# Patient Record
Sex: FEMALE | Race: WHITE | NOT HISPANIC OR LATINO | Employment: FULL TIME | URBAN - METROPOLITAN AREA
[De-identification: names, ages, dates, MRNs, and addresses within clinical notes are randomized per-mention and may not be internally consistent; named-entity substitution may affect disease eponyms.]

---

## 2017-02-17 ENCOUNTER — TELEPHONE (OUTPATIENT)
Dept: SPORTS MEDICINE | Facility: CLINIC | Age: 27
End: 2017-02-17

## 2017-02-17 DIAGNOSIS — R00.2 PALPITATIONS: Primary | ICD-10-CM

## 2017-02-18 ENCOUNTER — APPOINTMENT (OUTPATIENT)
Dept: GENERAL RADIOLOGY | Facility: HOSPITAL | Age: 27
End: 2017-02-18

## 2017-02-18 ENCOUNTER — HOSPITAL ENCOUNTER (EMERGENCY)
Facility: HOSPITAL | Age: 27
Discharge: HOME | End: 2017-02-18
Attending: EMERGENCY MEDICINE | Admitting: EMERGENCY MEDICINE

## 2017-02-18 VITALS
HEIGHT: 64 IN | BODY MASS INDEX: 18.78 KG/M2 | HEART RATE: 89 BPM | TEMPERATURE: 99.5 F | DIASTOLIC BLOOD PRESSURE: 68 MMHG | OXYGEN SATURATION: 99 % | WEIGHT: 110 LBS | SYSTOLIC BLOOD PRESSURE: 103 MMHG | RESPIRATION RATE: 18 BRPM

## 2017-02-18 DIAGNOSIS — R00.2 PALPITATIONS: ICD-10-CM

## 2017-02-18 DIAGNOSIS — R07.89 ATYPICAL CHEST PAIN: Primary | ICD-10-CM

## 2017-02-18 DIAGNOSIS — R00.0 SINUS TACHYCARDIA: ICD-10-CM

## 2017-02-18 LAB
ALBUMIN SERPL-MCNC: 4 G/DL (ref 3.5–5.2)
ALBUMIN/GLOB SERPL: 1.5 G/DL
ALP SERPL-CCNC: 52 U/L (ref 39–117)
ALT SERPL W P-5'-P-CCNC: 18 U/L (ref 1–33)
ANION GAP SERPL CALCULATED.3IONS-SCNC: 11.3 MMOL/L
AST SERPL-CCNC: 18 U/L (ref 1–32)
BASOPHILS # BLD AUTO: 0.01 10*3/MM3 (ref 0–0.2)
BASOPHILS NFR BLD AUTO: 0.2 % (ref 0–1.5)
BILIRUB SERPL-MCNC: 0.6 MG/DL (ref 0.1–1.2)
BUN BLD-MCNC: 9 MG/DL (ref 6–20)
BUN/CREAT SERPL: 11.1 (ref 7–25)
CALCIUM SPEC-SCNC: 9 MG/DL (ref 8.6–10.5)
CHLORIDE SERPL-SCNC: 102 MMOL/L (ref 98–107)
CO2 SERPL-SCNC: 26.7 MMOL/L (ref 22–29)
CREAT BLD-MCNC: 0.81 MG/DL (ref 0.57–1)
D DIMER PPP FEU-MCNC: <0.27 MCGFEU/ML (ref 0–0.49)
DEPRECATED RDW RBC AUTO: 42.8 FL (ref 37–54)
EOSINOPHIL # BLD AUTO: 0.05 10*3/MM3 (ref 0–0.7)
EOSINOPHIL NFR BLD AUTO: 1.1 % (ref 0.3–6.2)
ERYTHROCYTE [DISTWIDTH] IN BLOOD BY AUTOMATED COUNT: 12.6 % (ref 11.7–13)
GFR SERPL CREATININE-BSD FRML MDRD: 84 ML/MIN/1.73
GLOBULIN UR ELPH-MCNC: 2.6 GM/DL
GLUCOSE BLD-MCNC: 99 MG/DL (ref 65–99)
HCG SERPL QL: NEGATIVE
HCT VFR BLD AUTO: 39.9 % (ref 35.6–45.5)
HGB BLD-MCNC: 13.6 G/DL (ref 11.9–15.5)
IMM GRANULOCYTES # BLD: 0 10*3/MM3 (ref 0–0.03)
IMM GRANULOCYTES NFR BLD: 0 % (ref 0–0.5)
LYMPHOCYTES # BLD AUTO: 1.23 10*3/MM3 (ref 0.9–4.8)
LYMPHOCYTES NFR BLD AUTO: 26.2 % (ref 19.6–45.3)
MAGNESIUM SERPL-MCNC: 2.1 MG/DL (ref 1.6–2.6)
MCH RBC QN AUTO: 31.3 PG (ref 26.9–32)
MCHC RBC AUTO-ENTMCNC: 34.1 G/DL (ref 32.4–36.3)
MCV RBC AUTO: 91.9 FL (ref 80.5–98.2)
MONOCYTES # BLD AUTO: 0.42 10*3/MM3 (ref 0.2–1.2)
MONOCYTES NFR BLD AUTO: 8.9 % (ref 5–12)
NEUTROPHILS # BLD AUTO: 2.99 10*3/MM3 (ref 1.9–8.1)
NEUTROPHILS NFR BLD AUTO: 63.6 % (ref 42.7–76)
PLATELET # BLD AUTO: 222 10*3/MM3 (ref 140–500)
PMV BLD AUTO: 10.7 FL (ref 6–12)
POTASSIUM BLD-SCNC: 4.5 MMOL/L (ref 3.5–5.2)
PROT SERPL-MCNC: 6.6 G/DL (ref 6–8.5)
RBC # BLD AUTO: 4.34 10*6/MM3 (ref 3.9–5.2)
SODIUM BLD-SCNC: 140 MMOL/L (ref 136–145)
TROPONIN T SERPL-MCNC: <0.01 NG/ML (ref 0–0.03)
WBC NRBC COR # BLD: 4.7 10*3/MM3 (ref 4.5–10.7)

## 2017-02-18 PROCEDURE — 71020 HC CHEST PA AND LATERAL: CPT

## 2017-02-18 PROCEDURE — 93010 ELECTROCARDIOGRAM REPORT: CPT | Performed by: INTERNAL MEDICINE

## 2017-02-18 PROCEDURE — 93005 ELECTROCARDIOGRAM TRACING: CPT

## 2017-02-18 PROCEDURE — 84703 CHORIONIC GONADOTROPIN ASSAY: CPT | Performed by: EMERGENCY MEDICINE

## 2017-02-18 PROCEDURE — 84484 ASSAY OF TROPONIN QUANT: CPT | Performed by: EMERGENCY MEDICINE

## 2017-02-18 PROCEDURE — 80053 COMPREHEN METABOLIC PANEL: CPT | Performed by: EMERGENCY MEDICINE

## 2017-02-18 PROCEDURE — 36415 COLL VENOUS BLD VENIPUNCTURE: CPT

## 2017-02-18 PROCEDURE — 96360 HYDRATION IV INFUSION INIT: CPT

## 2017-02-18 PROCEDURE — 85379 FIBRIN DEGRADATION QUANT: CPT | Performed by: EMERGENCY MEDICINE

## 2017-02-18 PROCEDURE — 85025 COMPLETE CBC W/AUTO DIFF WBC: CPT | Performed by: EMERGENCY MEDICINE

## 2017-02-18 PROCEDURE — 83735 ASSAY OF MAGNESIUM: CPT | Performed by: EMERGENCY MEDICINE

## 2017-02-18 PROCEDURE — 99284 EMERGENCY DEPT VISIT MOD MDM: CPT

## 2017-02-18 RX ADMIN — SODIUM CHLORIDE 1000 ML: 9 INJECTION, SOLUTION INTRAVENOUS at 14:48

## 2017-02-18 NOTE — ED PROVIDER NOTES
"EMERGENCY DEPARTMENT ENCOUNTER       CHIEF COMPLAINT  Chief Complaint: Chest Pain  History given by: Patient, Significant Other  History limited by: N/A  Room Number: 30/30  PMD: Cipriano Zazueta MD      HPI:  HPI Comments: Pt c/o intermittent episodes of bilateral interscapular pain radiating to the LUE onset about 2 days ago. Pt reports that her most recent episode was earlier today and is not currently present. Pt states that exertion does not worsen sx. Pt denies BLE edema, diaphoresis, and dyspnea, but reports that she has had palpitations described as \"rapid\". Pt is scheduled for an appointment with [facility] on 02/23/17. Pt reports that she does not smoke and is not on any oral contraceptives. There are no other complaints at this time.     Patient is a 27 y.o. female presenting with chest pain.   Chest Pain   Chest pain location: interscapular bilaterally.  Pain quality: aching    Pain radiates to:  L arm  Pain severity:  Moderate  Onset quality:  Gradual  Duration: onset about 2 days ago.  Timing:  Intermittent  Progression:  Waxing and waning (but not present currently)  Context: at rest    Relieved by:  Nothing  Worsened by:  Nothing (not worsened with exertion)  Associated symptoms: palpitations (\"rapid\")    Associated symptoms: no abdominal pain, no back pain, no cough, no dizziness, no fatigue, no headache, no heartburn, no lower extremity edema, no nausea, no near-syncope, no numbness, no shortness of breath, no syncope, no vomiting and no weakness          PAST MEDICAL HISTORY  Active Ambulatory Problems     Diagnosis Date Noted   • Syncope 10/25/2016   • Hypoglycemia 10/25/2016     Resolved Ambulatory Problems     Diagnosis Date Noted   • No Resolved Ambulatory Problems     Past Medical History   Diagnosis Date   • Nodular hyperplasia of liver          PAST SURGICAL HISTORY  History reviewed. No pertinent past surgical history.      FAMILY HISTORY  Family History   Problem " "Relation Age of Onset   • Diabetes Mother    • Hypertension Mother    • Hypertension Father    • Diabetes Maternal Grandmother          SOCIAL HISTORY  Social History     Social History   • Marital status: Single     Spouse name: N/A   • Number of children: N/A   • Years of education: N/A     Occupational History   • Not on file.     Social History Main Topics   • Smoking status: Never Smoker   • Smokeless tobacco: Not on file   • Alcohol use Yes      Comment: socially   • Drug use: No   • Sexual activity: Not on file     Other Topics Concern   • Not on file     Social History Narrative   • No narrative on file         ALLERGIES  Penicillins        REVIEW OF SYSTEMS  Review of Systems   Constitutional: Negative for chills and fatigue.   HENT: Negative for congestion, rhinorrhea and sore throat.    Eyes: Negative for pain.   Respiratory: Negative for cough, shortness of breath and wheezing.    Cardiovascular: Positive for chest pain (interscapular pain bilaterally - not currently present) and palpitations (\"rapid\"). Negative for leg swelling, syncope and near-syncope.   Gastrointestinal: Negative for abdominal pain, diarrhea, heartburn, nausea and vomiting.   Genitourinary: Negative for difficulty urinating, dysuria, flank pain and frequency.   Musculoskeletal: Negative for arthralgias, back pain, myalgias, neck pain and neck stiffness.   Skin: Negative for rash.   Neurological: Negative for dizziness, speech difficulty, weakness, light-headedness, numbness and headaches.   Psychiatric/Behavioral: Negative.    All other systems reviewed and are negative.           PHYSICAL EXAM  ED Triage Vitals   Temp Heart Rate Resp BP SpO2   02/18/17 1321 02/18/17 1321 02/18/17 1321 02/18/17 1343 02/18/17 1321   98.8 °F (37.1 °C) 134 18 131/85 100 % WNL      Temp src Heart Rate Source Patient Position BP Location FiO2 (%)   02/18/17 1321 02/18/17 1321 -- -- --   Tympanic Monitor          Physical Exam   Constitutional: She is " oriented to person, place, and time. No distress.   HENT:   Head: Normocephalic.   Mouth/Throat: Mucous membranes are normal.   Eyes: EOM are normal. Pupils are equal, round, and reactive to light.   Neck: Normal range of motion. Neck supple.   Cardiovascular: Regular rhythm and normal heart sounds.  Tachycardia present.    Mild tachycardia.    Pulmonary/Chest: Effort normal and breath sounds normal. No respiratory distress. She has no decreased breath sounds. She has no wheezes. She has no rhonchi. She has no rales.   Abdominal: Soft. There is no tenderness. There is no rebound and no guarding.   Musculoskeletal: Normal range of motion.   Neurological: She is alert and oriented to person, place, and time. She has normal sensation and normal strength.   Skin: Skin is warm and dry.   Psychiatric: Mood and affect normal.   Nursing note and vitals reviewed.          LAB RESULTS  Recent Results (from the past 24 hour(s))   Comprehensive Metabolic Panel    Collection Time: 02/18/17  2:48 PM   Result Value Ref Range    Glucose 99 65 - 99 mg/dL    BUN 9 6 - 20 mg/dL    Creatinine 0.81 0.57 - 1.00 mg/dL    Sodium 140 136 - 145 mmol/L    Potassium 4.5 3.5 - 5.2 mmol/L    Chloride 102 98 - 107 mmol/L    CO2 26.7 22.0 - 29.0 mmol/L    Calcium 9.0 8.6 - 10.5 mg/dL    Total Protein 6.6 6.0 - 8.5 g/dL    Albumin 4.00 3.50 - 5.20 g/dL    ALT (SGPT) 18 1 - 33 U/L    AST (SGOT) 18 1 - 32 U/L    Alkaline Phosphatase 52 39 - 117 U/L    Total Bilirubin 0.6 0.1 - 1.2 mg/dL    eGFR Non African Amer 84 >60 mL/min/1.73    Globulin 2.6 gm/dL    A/G Ratio 1.5 g/dL    BUN/Creatinine Ratio 11.1 7.0 - 25.0    Anion Gap 11.3 mmol/L   hCG, Serum, Qualitative    Collection Time: 02/18/17  2:48 PM   Result Value Ref Range    HCG Qualitative Negative Indeterminate, Negative   Magnesium    Collection Time: 02/18/17  2:48 PM   Result Value Ref Range    Magnesium 2.1 1.6 - 2.6 mg/dL   CBC Auto Differential    Collection Time: 02/18/17  2:48 PM   Result  Value Ref Range    WBC 4.70 4.50 - 10.70 10*3/mm3    RBC 4.34 3.90 - 5.20 10*6/mm3    Hemoglobin 13.6 11.9 - 15.5 g/dL    Hematocrit 39.9 35.6 - 45.5 %    MCV 91.9 80.5 - 98.2 fL    MCH 31.3 26.9 - 32.0 pg    MCHC 34.1 32.4 - 36.3 g/dL    RDW 12.6 11.7 - 13.0 %    RDW-SD 42.8 37.0 - 54.0 fl    MPV 10.7 6.0 - 12.0 fL    Platelets 222 140 - 500 10*3/mm3    Neutrophil % 63.6 42.7 - 76.0 %    Lymphocyte % 26.2 19.6 - 45.3 %    Monocyte % 8.9 5.0 - 12.0 %    Eosinophil % 1.1 0.3 - 6.2 %    Basophil % 0.2 0.0 - 1.5 %    Immature Grans % 0.0 0.0 - 0.5 %    Neutrophils, Absolute 2.99 1.90 - 8.10 10*3/mm3    Lymphocytes, Absolute 1.23 0.90 - 4.80 10*3/mm3    Monocytes, Absolute 0.42 0.20 - 1.20 10*3/mm3    Eosinophils, Absolute 0.05 0.00 - 0.70 10*3/mm3    Basophils, Absolute 0.01 0.00 - 0.20 10*3/mm3    Immature Grans, Absolute 0.00 0.00 - 0.03 10*3/mm3   Troponin    Collection Time: 02/18/17  2:48 PM   Result Value Ref Range    Troponin T <0.010 0.000 - 0.030 ng/mL   D-dimer, Quantitative    Collection Time: 02/18/17  2:48 PM   Result Value Ref Range    D-Dimer, Quantitative <0.27 0.00 - 0.49 MCGFEU/mL       Ordered the above labs and reviewed the results.        RADIOLOGY  XR Chest 2 View - Negative acute. Interpreted by radiologist. Independently viewed by me.             Ordered the above noted radiological studies. Reviewed by me in PACS.       PROCEDURES  Procedures    EKG:           EKG time: 13:40  Rhythm/Rate: Sinus tachycardia rate 105  P waves and VT: Normal P. Normal JOHN.   Left atrial enlargement  QRS, axis: Normal axis.  ST and T waves: Nonspecific ST changes     Interpreted Contemporaneously by me, independently viewed  No old EKG available for comparison           PROGRESS AND CONSULTS  ED Course     2:33 PM: CXR, D-Dimer, and blood work ordered for further evaluation. IV fluids ordered to hydrate pt.     3:51 PM: Obtained pt's blood work and CXR results. Placed call to [facility].    4:06 PM:  Discussed case with Dr. Caro, cardiologist. She states that pt is scheduled for an appointment with her on 02/23/17. She would like for pt to f/u as scheduled. She is aware of pt's ZIO patch results.     4:10 PM: Rechecked pt. Pt is resting comfortably and appears in no acute distress. Informed pt that her CXR is negative acute. Troponin is negative. D-Dimer is <0.27. Informed pt also of my d/w Dr. Caro. Pt advised to f/u as scheduled with [facility] on 02/23/17. RTER warnings given. Pt agrees with plan for discharge.           MEDICAL DECISION MAKING    MDM  Number of Diagnoses or Management Options  Atypical chest pain:   Palpitations:   Sinus tachycardia:      Amount and/or Complexity of Data Reviewed  Clinical lab tests: reviewed and ordered (Troponin is negative. D-Dimer is <0.27. )  Tests in the radiology section of CPT®: ordered and reviewed (CXR is negative acute. )  Tests in the medicine section of CPT®: ordered and reviewed (EKG interpreted.   )  Decide to obtain previous medical records or to obtain history from someone other than the patient: yes  Review and summarize past medical records: yes (Pt had 2-week ZIO patch placed in late 12/2016 that showed HR varying from 47 to 184 and rare PVC. Pt had normal ECHO performed on 11/30/16. TSH and Free T4 were normal in 10/2016. )  Discuss the patient with other providers: yes (Dr. Caro, cardiologist, will f/u with pt in the office. )    Patient Progress  Patient progress: stable             DIAGNOSIS  Final diagnoses:   Atypical chest pain   Palpitations   Sinus tachycardia         DISPOSITION  Pt discharged.    DISCHARGE    Patient discharged in stable condition.    Reviewed implications of results, diagnosis, meds, responsibility to follow up, warning signs and symptoms of possible worsening, potential complications and reasons to return to ER.    Patient/Family voiced understanding of above instructions.    Discussed plan for discharge, as there  is no emergent indication for admission.  Pt/family is agreeable and understands need for follow up and repeat testing.  Pt is aware that discharge does not mean that nothing is wrong but it indicates no emergency is present that requires admission and they must continue care with follow-up as given below or physician of their choice.     FOLLOW-UP  Cipriano Zazueta MD  [address]  [address]  [facility] [id number] [phone]          Alyson Caro MD  [address]  [address]  [facility] [address]  [phone]      keep appointment next Thursday          Latest Documented Vital Signs:  As of 4:16 PM  BP- 103/68 HR- 84 Temp- 99.5 °F (37.5 °C) (Tympanic) O2 sat- 99%        --  Documentation assistance provided by padmini Simon for Dr. Malu MD.  Information recorded by the scribe was done at my direction and has been verified and validated by me.                Blaise Simon  02/18/17 8530       Sanjeev Toribio MD  02/18/17 0563

## 2017-02-18 NOTE — ED NOTES
Pt c/o constant pain between shoulder blades that started this past Thursday. Pt reports no recent injury but states she did work out x4 days this week. No obvious deformity noted.      Christina Goff RN  02/18/17 1614

## 2017-02-18 NOTE — ED NOTES
PT RESTING COMFORTABLY IN NAD. PT AWAITING MD DISPOSITION.      Christina Goff RN  02/18/17 7125

## 2017-02-18 NOTE — ED NOTES
CP with pain /B/ shoulder blades since Thursday with increased HR and SOA     Meghan Purdy RN  02/18/17 5600

## 2017-02-20 ENCOUNTER — TELEPHONE (OUTPATIENT)
Dept: SOCIAL WORK | Facility: HOSPITAL | Age: 27
End: 2017-02-20

## 2017-02-20 NOTE — TELEPHONE ENCOUNTER
Spoke with pt today in f/u and she states that she is feeling better and plans on keeping her f/u appt with Dr. Caro this Thursday. No other questions or concerns voiced by pt at this time. Teresa GALLAGHER

## 2017-02-23 ENCOUNTER — OFFICE VISIT (OUTPATIENT)
Dept: CARDIOLOGY | Facility: CLINIC | Age: 27
End: 2017-02-23

## 2017-02-23 VITALS
HEART RATE: 79 BPM | DIASTOLIC BLOOD PRESSURE: 64 MMHG | SYSTOLIC BLOOD PRESSURE: 108 MMHG | BODY MASS INDEX: 19.12 KG/M2 | WEIGHT: 112 LBS | HEIGHT: 64 IN

## 2017-02-23 DIAGNOSIS — R55 SYNCOPE, UNSPECIFIED SYNCOPE TYPE: ICD-10-CM

## 2017-02-23 DIAGNOSIS — R10.11 RUQ PAIN: ICD-10-CM

## 2017-02-23 DIAGNOSIS — R00.0 SINUS TACHYCARDIA: Primary | ICD-10-CM

## 2017-02-23 DIAGNOSIS — R00.2 PALPITATIONS: ICD-10-CM

## 2017-02-23 PROCEDURE — 99204 OFFICE O/P NEW MOD 45 MIN: CPT | Performed by: INTERNAL MEDICINE

## 2017-02-23 PROCEDURE — 93000 ELECTROCARDIOGRAM COMPLETE: CPT | Performed by: INTERNAL MEDICINE

## 2017-02-23 RX ORDER — ATENOLOL 25 MG/1
25 TABLET ORAL DAILY PRN
Qty: 30 TABLET | Refills: 11 | Status: SHIPPED | OUTPATIENT
Start: 2017-02-23 | End: 2017-08-02

## 2017-02-23 NOTE — PROGRESS NOTES
PATIENTINFORMATION    Date of Office Visit: 2017  Encounter Provider: Alyson Caro MD  Place of Service: [facility]  Patient Name: Queenie Mccoy  : 1990    Subjective:     Encounter Date:2017      Patient ID: Queenie Mccoy is a 27 y.o. female.      History of Present Illness     This is a nice lady who began having episodes of palpitations and free syncope in the fall of .  Her primary care provider who referred her here today, ordered a ZIO Patch which showed sinus rhythm with an average heart rate of 83 beats per minute.  There were rare premature ventricular contractions.   She hit the button for symptoms twice and both times showed just sinus tachycardia.   She also had an echocardiogram in 2016 which showed normal left ventricular systolic function with an ejection fraction of 60% and no valvular heart disease.      She continues to have episodes where she will feel like her heart is suddenly racing out of her chest.  She does not feel well during those episodes.  She will feel kind of like she is going to pass out and sort of lightheaded and unwell.  She had a trip to the emergency room on 2017 for these symptoms and her EKG there showed sinus tachycardia at 108 beats per minute.  She states that she was symptomatic when that EKG was performed. She does not note any exacerbating or relieving factors for these symptoms.        Review of Systems   Constitution: Positive for malaise/fatigue. Negative for fever, weight gain and weight loss.   HENT: Negative for ear pain, hearing loss, nosebleeds and sore throat.    Eyes: Negative for double vision, pain, vision loss in left eye and vision loss in right eye.   Cardiovascular:        See history of present illness.   Respiratory: Negative for cough, shortness of breath, sleep disturbances due to breathing, snoring and wheezing.    Endocrine: Negative for cold intolerance, heat  "intolerance and polyuria.   Skin: Negative for itching, poor wound healing and rash.   Musculoskeletal: Negative for joint pain, joint swelling and myalgias.   Gastrointestinal: Negative for abdominal pain, diarrhea, hematochezia, nausea and vomiting.   Genitourinary: Negative for hematuria and hesitancy.   Neurological: Negative for numbness, paresthesias and seizures.   Psychiatric/Behavioral: Negative for depression. The patient is not nervous/anxious.            ECG 12 Lead  Date/Time: 2/23/2017 3:22 PM  Performed by: JOCELYN HANKINS  Authorized by: JOCELYN HANKINS   Comparison: compared with previous ECG from 2/18/2017  Comparison to previous ECG: Her heart rate is now slower.  She has a different P-wave morphology in V1 and V2  Rhythm: sinus rhythm  BPM: 79  Conduction: conduction normal  ST Segments: ST segments normal  Clinical impression: normal ECG               Objective:       Visit Vitals   • /64 (BP Location: Right arm)   • Pulse 79   • Ht 64\" (162.6 cm)   • Wt 112 lb (50.8 kg)   • BMI 19.22 kg/m2    Body mass index is 19.22 kg/(m^2).     Physical Exam   Constitutional: She appears well-developed.   HENT:   Head: Normocephalic and atraumatic.   Eyes: Conjunctivae and lids are normal. Pupils are equal, round, and reactive to light. Lids are everted and swept, no foreign bodies found.   Neck: Normal range of motion. No JVD present. Carotid bruit is not present. No tracheal deviation present. No thyroid mass present.   Cardiovascular: Normal rate, regular rhythm and normal heart sounds.    Pulses:       Dorsalis pedis pulses are 2+ on the right side, and 2+ on the left side.   Pulmonary/Chest: Effort normal and breath sounds normal.   Abdominal: Normal appearance and bowel sounds are normal.   Musculoskeletal: Normal range of motion.   Neurological: She is alert. She has normal strength.   Skin: Skin is warm, dry and intact.   Psychiatric: She has a normal mood and affect. Her behavior is normal. "   Vitals reviewed.      Lab Review: I reviewed her lab work from her recent ER visit.  As well as a TSH from a fall      Assessment/Plan:        Symptomatic tachycardia.  I looked over her lab work. Her thyroid is normal, she is not anemic,  her kidney function and electrolytes look good. She denies any anxiety when she is having this happen.  There may be some dehydration involved.  I think she may possibly be having a little bit of an atrial tachycardia that may just be really close to the sinus node.   I would like to show her EKGs to Dr. Bryan.   She may also just be having a little bit of sinus tachycardia due to some dehydration.  We have talked if she has these symptoms again to try a Valsalva maneuver.   She is going to try to drink some fluids.  If that does not work, I have given her a prescription for Atenolol 25 mg to take up to twice a day, if needed, for the palpitations.  I am going to have her follow back up with me in a couple of weeks and see if she is feeling any better.  She is also having some right upper quadrant pain, so we will check a gallbladder ultrasound.          Orders Placed This Encounter   Procedures   • US gallbladder     Standing Status:   Future     Standing Expiration Date:   2/23/2018     Order Specific Question:   Reason for Exam:     Answer:   RUQ pain   • ECG 12 Lead     This order was created via procedure documentation      Queenie Mccoy   Home Medication Instructions JAGDISH:    Printed on:02/23/17 5632   Medication Information                      atenolol (TENORMIN) 25 MG tablet  Take 1 tablet by mouth Daily As Needed (heart racing).                        Alyson Caro MD  02/23/17  3:24 PM

## 2017-03-01 ENCOUNTER — HOSPITAL ENCOUNTER (OUTPATIENT)
Dept: ULTRASOUND IMAGING | Facility: HOSPITAL | Age: 27
Discharge: HOME | End: 2017-03-01
Attending: INTERNAL MEDICINE | Admitting: INTERNAL MEDICINE

## 2017-03-01 DIAGNOSIS — R10.11 RUQ PAIN: ICD-10-CM

## 2017-03-01 PROCEDURE — 76705 ECHO EXAM OF ABDOMEN: CPT

## 2017-04-06 ENCOUNTER — OFFICE VISIT (OUTPATIENT)
Dept: CARDIOLOGY | Facility: CLINIC | Age: 27
End: 2017-04-06

## 2017-04-06 VITALS
HEART RATE: 84 BPM | HEIGHT: 64 IN | RESPIRATION RATE: 16 BRPM | WEIGHT: 115 LBS | BODY MASS INDEX: 19.63 KG/M2 | SYSTOLIC BLOOD PRESSURE: 108 MMHG | DIASTOLIC BLOOD PRESSURE: 70 MMHG

## 2017-04-06 DIAGNOSIS — R00.2 PALPITATIONS: Primary | ICD-10-CM

## 2017-04-06 DIAGNOSIS — R00.0 SINUS TACHYCARDIA: ICD-10-CM

## 2017-04-06 PROCEDURE — 99212 OFFICE O/P EST SF 10 MIN: CPT | Performed by: INTERNAL MEDICINE

## 2017-04-06 PROCEDURE — 93000 ELECTROCARDIOGRAM COMPLETE: CPT | Performed by: INTERNAL MEDICINE

## 2017-04-06 NOTE — PROGRESS NOTES
PATIENTINFORMATION    Date of Office Visit: 2017  Encounter Provider: Alyson Caro MD  Place of Service: [facility] CARDIOLOGY  Patient Name: Queenie Mccoy  : 1990    Subjective:     Encounter Date:2017      Patient ID: Queenie Mccoy is a 27 y.o. female.      History of Present Illness    This is a nice lady who began having episodes of palpitations and free syncope in the fall of . Her primary care provider who referred her here today, ordered a ZIO Patch which showed sinus rhythm with an average heart rate of 83 beats per minute. There were rare premature ventricular contractions. She hit the button for symptoms twice and both times showed just sinus tachycardia. She also had an echocardiogram in 2016 which showed normal left ventricular systolic function with an ejection fraction of 60% and no valvular heart disease.     I saw her in 2017 for symptoms of palpitations.  I gave her a prescription for atenolol 25 mg twice a day as needed.  I did review all of her blood work and she was not anemic and her thyroid was normal as were her electrolytes.    She comes in today for follow-up.  She has been feeling well.  She has not had any of the palpitations consistent with sinus tachycardia.  She has not had to take the atenolol.  She has been exercising and has no symptoms with exertion.  She does mention a different sort of palpitation which in her description is very consistent with the premature ventricular contraction.  These are not bothersome to her.    Review of Systems   Constitution: Negative for fever, malaise/fatigue, weight gain and weight loss.   HENT: Negative for ear pain, hearing loss, nosebleeds and sore throat.    Eyes: Negative for double vision, pain, vision loss in left eye and vision loss in right eye.   Cardiovascular:        See history of present illness.   Respiratory: Negative for cough, shortness of breath, sleep  "disturbances due to breathing, snoring and wheezing.    Endocrine: Negative for cold intolerance, heat intolerance and polyuria.   Skin: Negative for itching, poor wound healing and rash.   Musculoskeletal: Negative for joint pain, joint swelling and myalgias.   Gastrointestinal: Negative for abdominal pain, diarrhea, hematochezia, nausea and vomiting.   Genitourinary: Negative for hematuria and hesitancy.   Neurological: Negative for numbness, paresthesias and seizures.   Psychiatric/Behavioral: Negative for depression. The patient is not nervous/anxious.            ECG 12 Lead  Date/Time: 4/6/2017 1:12 PM  Performed by: JOCELYN HANKINS  Authorized by: JOCELYN HANKINS   Comparison: compared with previous ECG from 2/23/2017  Similar to previous ECG  Rhythm: sinus rhythm  BPM: 84  Conduction: conduction normal  ST Segments: ST segments normal  Clinical impression: normal ECG               Objective:     /70 (BP Location: Right arm, Patient Position: Sitting, Cuff Size: Adult)  Pulse 84  Resp 16  Ht 64\" (162.6 cm)  Wt 115 lb (52.2 kg)  BMI 19.74 kg/m2 Body mass index is 19.74 kg/(m^2).     Physical Exam   Constitutional: She appears well-developed.   HENT:   Head: Normocephalic and atraumatic.   Eyes: Conjunctivae and lids are normal. Pupils are equal, round, and reactive to light. Lids are everted and swept, no foreign bodies found.   Neck: Normal range of motion. No JVD present. Carotid bruit is not present. No tracheal deviation present. No thyroid mass present.   Cardiovascular: Normal rate, regular rhythm and normal heart sounds.    Pulses:       Dorsalis pedis pulses are 2+ on the right side, and 2+ on the left side.   Pulmonary/Chest: Effort normal and breath sounds normal.   Abdominal: Normal appearance and bowel sounds are normal.   Musculoskeletal: Normal range of motion.   Neurological: She is alert. She has normal strength.   Skin: Skin is warm, dry and intact.   Psychiatric: She has a normal " mood and affect. Her behavior is normal.   Vitals reviewed.          Assessment/Plan:       She had some palpitations associated with sinus tachycardia.  This has resolved.  She does have a prescription for atenolol if needed.  She has a second palpitation sensation which is quite consistent with premature ventricular contraction and I reassured her.  I will have her come back and see me if she has any change in her symptoms.    Orders Placed This Encounter   Procedures   • ECG 12 Lead     This order was created via procedure documentation      Queenie Mccoy   Home Medication Instructions JAGDISH:    Printed on:04/06/17 8930   Medication Information                      atenolol (TENORMIN) 25 MG tablet  Take 1 tablet by mouth Daily As Needed (heart racing).                        Alyson Caro MD  04/06/17  1:17 PM

## 2017-08-02 ENCOUNTER — OFFICE VISIT (OUTPATIENT)
Dept: SPORTS MEDICINE | Facility: CLINIC | Age: 27
End: 2017-08-02

## 2017-08-02 VITALS
HEART RATE: 83 BPM | DIASTOLIC BLOOD PRESSURE: 62 MMHG | WEIGHT: 119 LBS | OXYGEN SATURATION: 98 % | HEIGHT: 65 IN | SYSTOLIC BLOOD PRESSURE: 100 MMHG | BODY MASS INDEX: 19.83 KG/M2

## 2017-08-02 DIAGNOSIS — S76.011A STRAIN OF GLUTEUS MEDIUS, RIGHT, INITIAL ENCOUNTER: Primary | ICD-10-CM

## 2017-08-02 PROCEDURE — 99214 OFFICE O/P EST MOD 30 MIN: CPT | Performed by: FAMILY MEDICINE

## 2017-08-02 PROCEDURE — 73502 X-RAY EXAM HIP UNI 2-3 VIEWS: CPT | Performed by: FAMILY MEDICINE

## 2017-08-02 NOTE — PROGRESS NOTES
"Queenie is a 27 y.o. year old female    Chief Complaint   Patient presents with   • Right Hip - Pain       History of Present Illness  Patient here today with a six-day week history of intermittent episodes of right hip pain.  Discomfort initially began over the anterior hip but her most bothersome area of discomfort is over the lateral hip.  Discomfort will come on and last for a couple of days and then be gone for several days at a time and then return.  She notes no aggravating or precipitating events.  The only exercise she does is bar exercises.  She does not do endurance running or endurance weightbearing exercises.  The lateral hip pain can be worse lying on her right side at night.  No back pain or paresthesias.  She has not noted any clicking, locking, or giving way of the right hip.  No snapping sensations.  No paresthesias.    I have reviewed the patient's medical history in detail and updated the computerized patient record.    Review of Systems   Constitutional: Negative for fever.   Musculoskeletal:        Per history of present illness   Skin: Negative for wound.   Neurological: Negative for numbness.   All other systems reviewed and are negative.      /62  Pulse 83  Ht 64.5\" (163.8 cm)  Wt 119 lb (54 kg)  SpO2 98%  BMI 20.11 kg/m2     Physical Exam    Vital signs reviewed.   General: No acute distress.  Eyes: conjunctiva clear; pupils equally round and reactive  ENT: external ears and nose atraumatic; oropharynx clear  CV: no peripheral edema, 2+ distal pulses  Resp: normal respiratory effort, no use of accessory muscles  Skin: no rashes or wounds; normal turgor  Psych: mood and affect appropriate; recent and remote memory intact  Neuro: sensation to light touch intact    MSK Exam:  Right hip and low back normal in general appearance.  Negative logroll.  Patient has tenderness to palpation over the superior portion of the greater trochanter.  Patient has full painless range of motion of the " hip.  Patient has some lateral hip discomfort on internal rotation and also with resisted abduction.  There is no snapping or clicking.  No pain with resisted hip flexion or extension.  No pain with resisted hip adduction.  Motor 5 out of 5.    Right Hip X-Ray  Indication: Pain  AP and Frog Leg views    Findings:  No fracture  No bony lesion  Normal soft tissues  Normal joint spaces    No prior studies were available for comparison.      Diagnoses and all orders for this visit:    Right hip pain  -     XR Hip With or Without Pelvis 2 - 3 View Right    I have given her a set of home exercises for gluteus medius syndrome, she will follow-up in 3 weeks if no improvement in her pain.  Or return sooner if pain increases.    EMR Dragon/Transcription disclaimer:    Much of this encounter note is an electronic transcription/translation of spoken language to printed text.  The electronic translation of spoken language may permit erroneous, or at times, nonsensical words or phrases to be inadvertently transcribed.  Although I have reviewed the note for such errors some may still exist.

## 2017-09-19 ENCOUNTER — OFFICE VISIT (OUTPATIENT)
Dept: GASTROENTEROLOGY | Facility: CLINIC | Age: 27
End: 2017-09-19

## 2017-09-19 VITALS
WEIGHT: 119.4 LBS | TEMPERATURE: 98.4 F | SYSTOLIC BLOOD PRESSURE: 114 MMHG | DIASTOLIC BLOOD PRESSURE: 70 MMHG | BODY MASS INDEX: 20.38 KG/M2 | HEIGHT: 64 IN

## 2017-09-19 DIAGNOSIS — R10.13 EPIGASTRIC PAIN: ICD-10-CM

## 2017-09-19 DIAGNOSIS — R09.89 GLOBUS SENSATION: Primary | ICD-10-CM

## 2017-09-19 PROCEDURE — 99203 OFFICE O/P NEW LOW 30 MIN: CPT | Performed by: INTERNAL MEDICINE

## 2017-09-19 RX ORDER — OMEPRAZOLE 20 MG/1
20 CAPSULE, DELAYED RELEASE ORAL DAILY
Qty: 30 CAPSULE | Refills: 0 | Status: SHIPPED | OUTPATIENT
Start: 2017-09-19 | End: 2017-10-19

## 2017-09-19 NOTE — PROGRESS NOTES
Answers for HPI/ROS submitted by the patient on 9/12/2017   Abdominal pain  Chronicity: recurrent  Onset: more than 1 month ago  Onset quality: sudden  Frequency: every several days  Episode duration: 6 hours  Progression since onset: gradually worsening  Pain location: LUQ, RUQ, epigastric region  Pain - numeric: 7/10  Pain quality: burning, a sensation of fullness, sharp  Radiates to: periumbilical region, back  anorexia: No  arthralgias: No  belching: Yes  constipation: No  diarrhea: Yes  dysuria: No  fever: No  flatus: Yes  frequency: No  headaches: No  hematochezia: No  hematuria: No  melena: No  myalgias: No  nausea: Yes  weight loss: No  vomiting: No  Chief Complaint   Patient presents with   • Heartburn   • Abdominal Pain     Queenie Mccoy is a 27 y.o. female who presents with a History of abdominal pain and GERD   HPI     Patient 27-year-old female with history of focal nodular hyperplasia of the liver diagnosed in 2011.  Patient with recent ultrasound shows mild decrease in this size of the lesion.  Patient currently complaining of intermittent but patient's found by cardiology to be sinus tachycardia.  Patient reports these episodes seem to occur when her stomach is most upset.  While the stomach issues may last for several days her palpitations may last only for a few minutes.  Patient now here for further recommendations.  Patient denies nausea vomiting.  Patient reports there is some increase symptoms around meals and worse after lunch.  Patient reports no waking up from the pain and discomfort she describes.    Past Medical History:   Diagnosis Date   • Chest pain    • Hypoglycemia    • Lightheaded    • Malaise and fatigue    • Nodular hyperplasia of liver    • Palpitations    • PVC (premature ventricular contraction)     rare   • Right upper quadrant pain    • Sinus tachycardia    • Symptomatic tachycardia    • Syncope        Current Outpatient Prescriptions:   •  omeprazole (priLOSEC) 20 MG  capsule, Take 1 capsule by mouth Daily for 30 days., Disp: 30 capsule, Rfl: 0  Allergies   Allergen Reactions   • Penicillins      Social History     Social History   • Marital status: Single     Spouse name: N/A   • Number of children: N/A   • Years of education: N/A     Occupational History   • Not on file.     Social History Main Topics   • Smoking status: Never Smoker   • Smokeless tobacco: Not on file   • Alcohol use Yes      Comment: socially   • Drug use: No   • Sexual activity: Not on file     Other Topics Concern   • Not on file     Social History Narrative     Family History   Problem Relation Age of Onset   • Diabetes Mother    • Hypertension Mother    • Hypertension Father    • Diabetes Maternal Grandmother    • Pancreatic cancer Maternal Aunt    • Pancreatic cancer Maternal Uncle      Review of Systems   Constitutional: Negative.  Negative for fever.   HENT: Negative.    Eyes: Negative.    Respiratory: Negative.    Cardiovascular: Negative.    Gastrointestinal: Positive for abdominal pain, diarrhea and nausea. Negative for blood in stool, constipation, rectal pain and vomiting.   Endocrine: Negative.    Genitourinary: Negative for dysuria, frequency and hematuria.   Musculoskeletal: Negative.  Negative for arthralgias and myalgias.   Allergic/Immunologic: Positive for immunocompromised state.   Neurological: Negative for headaches.     Vitals:    09/19/17 1406   BP: 114/70   Temp: 98.4 °F (36.9 °C)     Physical Exam   Constitutional: She is oriented to person, place, and time. She appears well-developed and well-nourished.   HENT:   Head: Normocephalic and atraumatic.   Eyes: Pupils are equal, round, and reactive to light. No scleral icterus.   Cardiovascular: Normal rate and regular rhythm.  Exam reveals no gallop and no friction rub.    No murmur heard.  Pulmonary/Chest: Effort normal and breath sounds normal. She has no wheezes. She has no rales.   Abdominal: Soft. Bowel sounds are normal. She  exhibits no shifting dullness, no distension, no pulsatile liver, no fluid wave, no abdominal bruit, no ascites, no pulsatile midline mass and no mass. There is no hepatosplenomegaly. There is no tenderness. There is no rigidity and no guarding. No hernia.   Musculoskeletal: Normal range of motion. She exhibits no edema.   Neurological: She is alert and oriented to person, place, and time. No cranial nerve deficit.   Skin: Skin is warm and dry. No rash noted.   Psychiatric: She has a normal mood and affect. Her behavior is normal. Thought content normal.   Nursing note and vitals reviewed.    Diagnoses and all orders for this visit:    Globus sensation  -     FL Esophagram Complete; Future    Epigastric pain  -     FL Esophagram Complete; Future    Other orders  -     omeprazole (priLOSEC) 20 MG capsule; Take 1 capsule by mouth Daily for 30 days.    Patient 27-year-old female with history of focal nodular hyperplasia in the liver noted to be somewhat shrunken on ultrasound recently.  Patient complaining of multiple GI complaints including occasional globus, heartburn, epigastric pain and fullness.  Patient reports symptoms worse after lunch but do not wake her up at night.  Patient reports when episodes exacerbate she may notice palpitations.  Patient seen by cardiology though no reported GI issues were described at time, palpitations seem to occur more frequently when her GI symptoms are irritated.  At this point will arrange esophagram as well as begin omeprazole daily pending results may consider further evaluation with EGD.

## 2017-09-26 ENCOUNTER — HOSPITAL ENCOUNTER (OUTPATIENT)
Dept: GENERAL RADIOLOGY | Facility: HOSPITAL | Age: 27
Discharge: HOME | End: 2017-09-26
Attending: INTERNAL MEDICINE | Admitting: INTERNAL MEDICINE

## 2017-09-26 DIAGNOSIS — R10.13 EPIGASTRIC PAIN: ICD-10-CM

## 2017-09-26 DIAGNOSIS — R09.89 GLOBUS SENSATION: ICD-10-CM

## 2017-09-26 PROCEDURE — 74220 X-RAY XM ESOPHAGUS 1CNTRST: CPT

## 2017-09-27 ENCOUNTER — PREP FOR SURGERY (OUTPATIENT)
Dept: OTHER | Facility: HOSPITAL | Age: 27
End: 2017-09-27

## 2017-09-27 DIAGNOSIS — K21.00 GASTROESOPHAGEAL REFLUX DISEASE WITH ESOPHAGITIS: Primary | ICD-10-CM

## 2017-09-28 ENCOUNTER — TELEPHONE (OUTPATIENT)
Dept: GASTROENTEROLOGY | Facility: CLINIC | Age: 27
End: 2017-09-28

## 2017-09-28 NOTE — TELEPHONE ENCOUNTER
Patient called, no answer, message left advising of Dr Gama's note. Advised to call back with questions.

## 2017-09-28 NOTE — TELEPHONE ENCOUNTER
----- Message from Wally Gama MD sent at 9/27/2017  9:07 AM EDT -----  Positive reflux but no stricture seen.  Recommend EGD to evaluate cause symptoms

## 2017-10-23 PROBLEM — K21.00 GASTROESOPHAGEAL REFLUX DISEASE WITH ESOPHAGITIS: Status: ACTIVE | Noted: 2017-10-23

## 2017-11-10 ENCOUNTER — ANESTHESIA (OUTPATIENT)
Dept: GASTROENTEROLOGY | Facility: HOSPITAL | Age: 27
End: 2017-11-10

## 2017-11-10 ENCOUNTER — HOSPITAL ENCOUNTER (OUTPATIENT)
Facility: HOSPITAL | Age: 27
Setting detail: HOSPITAL OUTPATIENT SURGERY
Discharge: HOME | End: 2017-11-10
Attending: INTERNAL MEDICINE | Admitting: INTERNAL MEDICINE

## 2017-11-10 ENCOUNTER — ANESTHESIA EVENT (OUTPATIENT)
Dept: GASTROENTEROLOGY | Facility: HOSPITAL | Age: 27
End: 2017-11-10

## 2017-11-10 VITALS
RESPIRATION RATE: 16 BRPM | DIASTOLIC BLOOD PRESSURE: 60 MMHG | SYSTOLIC BLOOD PRESSURE: 97 MMHG | WEIGHT: 114.5 LBS | OXYGEN SATURATION: 100 % | BODY MASS INDEX: 19.55 KG/M2 | TEMPERATURE: 98.6 F | HEIGHT: 64 IN | HEART RATE: 67 BPM

## 2017-11-10 DIAGNOSIS — K21.00 GASTROESOPHAGEAL REFLUX DISEASE WITH ESOPHAGITIS: ICD-10-CM

## 2017-11-10 LAB
B-HCG UR QL: NEGATIVE
INTERNAL NEGATIVE CONTROL: NEGATIVE
INTERNAL POSITIVE CONTROL: POSITIVE
Lab: NORMAL

## 2017-11-10 PROCEDURE — 25010000002 PROPOFOL 10 MG/ML EMULSION: Performed by: ANESTHESIOLOGY

## 2017-11-10 PROCEDURE — 43239 EGD BIOPSY SINGLE/MULTIPLE: CPT | Performed by: INTERNAL MEDICINE

## 2017-11-10 PROCEDURE — 88305 TISSUE EXAM BY PATHOLOGIST: CPT | Performed by: INTERNAL MEDICINE

## 2017-11-10 PROCEDURE — 88312 SPECIAL STAINS GROUP 1: CPT | Performed by: INTERNAL MEDICINE

## 2017-11-10 PROCEDURE — S0260 H&P FOR SURGERY: HCPCS | Performed by: INTERNAL MEDICINE

## 2017-11-10 RX ORDER — SODIUM CHLORIDE 0.9 % (FLUSH) 0.9 %
3 SYRINGE (ML) INJECTION AS NEEDED
Status: DISCONTINUED | OUTPATIENT
Start: 2017-11-10 | End: 2017-11-10 | Stop reason: HOSPADM

## 2017-11-10 RX ORDER — ESOMEPRAZOLE MAGNESIUM 40 MG/1
40 CAPSULE, DELAYED RELEASE ORAL DAILY
Qty: 90 CAPSULE | Refills: 3 | Status: SHIPPED | OUTPATIENT
Start: 2017-11-10

## 2017-11-10 RX ORDER — LIDOCAINE HYDROCHLORIDE 10 MG/ML
0.5 INJECTION, SOLUTION INFILTRATION; PERINEURAL ONCE AS NEEDED
Status: DISCONTINUED | OUTPATIENT
Start: 2017-11-10 | End: 2017-11-10 | Stop reason: HOSPADM

## 2017-11-10 RX ORDER — PROPOFOL 10 MG/ML
VIAL (ML) INTRAVENOUS AS NEEDED
Status: DISCONTINUED | OUTPATIENT
Start: 2017-11-10 | End: 2017-11-10 | Stop reason: SURG

## 2017-11-10 RX ORDER — LIDOCAINE HYDROCHLORIDE 20 MG/ML
INJECTION, SOLUTION INFILTRATION; PERINEURAL AS NEEDED
Status: DISCONTINUED | OUTPATIENT
Start: 2017-11-10 | End: 2017-11-10 | Stop reason: SURG

## 2017-11-10 RX ORDER — SODIUM CHLORIDE, SODIUM LACTATE, POTASSIUM CHLORIDE, CALCIUM CHLORIDE 600; 310; 30; 20 MG/100ML; MG/100ML; MG/100ML; MG/100ML
1000 INJECTION, SOLUTION INTRAVENOUS CONTINUOUS PRN
Status: DISCONTINUED | OUTPATIENT
Start: 2017-11-10 | End: 2017-11-10 | Stop reason: HOSPADM

## 2017-11-10 RX ADMIN — PROPOFOL 250 MG: 10 INJECTION, EMULSION INTRAVENOUS at 09:14

## 2017-11-10 RX ADMIN — LIDOCAINE HYDROCHLORIDE 100 MG: 20 INJECTION, SOLUTION INFILTRATION; PERINEURAL at 09:14

## 2017-11-10 RX ADMIN — SODIUM CHLORIDE, POTASSIUM CHLORIDE, SODIUM LACTATE AND CALCIUM CHLORIDE 1000 ML: 600; 310; 30; 20 INJECTION, SOLUTION INTRAVENOUS at 08:35

## 2017-11-10 NOTE — PLAN OF CARE
Problem: Patient Care Overview (Adult)  Goal: Adult Individualization and Mutuality  Outcome: Ongoing (interventions implemented as appropriate)    Problem: GI Endoscopy (Adult)  Goal: Signs and Symptoms of Listed Potential Problems Will be Absent or Manageable (GI Endoscopy)  Outcome: Ongoing (interventions implemented as appropriate)    11/10/17 0808   GI Endoscopy   Problems Assessed (GI Endoscopy) all   Problems Present (GI Endoscopy) none

## 2017-11-10 NOTE — ANESTHESIA PREPROCEDURE EVALUATION
Anesthesia Evaluation     Patient summary reviewed and Nursing notes reviewed   NPO Solid Status: > 8 hours  NPO Liquid Status: > 8 hours     Airway   Mallampati: I  TM distance: <3 FB  Neck ROM: full  no difficulty expected  Dental - normal exam     Pulmonary - negative pulmonary ROS and normal exam   Cardiovascular - negative cardio ROS and normal exam        Neuro/Psych- negative ROS  GI/Hepatic/Renal/Endo - negative ROS     Musculoskeletal (-) negative ROS    Abdominal  - normal exam    Bowel sounds: normal.   Substance History - negative use     OB/GYN negative ob/gyn ROS         Other                                      Anesthesia Plan    ASA 1     Anesthetic plan and risks discussed with patient.

## 2017-11-10 NOTE — ANESTHESIA POSTPROCEDURE EVALUATION
Patient: Queenie Mccoy    Procedure Summary     Date Anesthesia Start Anesthesia Stop Room / Location    11/10/17 0914 0928  [facility] 5 /  [facility]       Procedure Diagnosis Surgeon Provider    ESOPHAGOGASTRODUODENOSCOPY with biopsies (N/A Esophagus) Gastritis; Duodenitis  (Gastroesophageal reflux disease with esophagitis [K21.0]) MD Anne Marie Mar MD          Anesthesia Type: No value filed.  Last vitals  BP   94/67 (11/10/17 0942)   Temp   37 °C (98.6 °F) (11/10/17 0825)   Pulse   61 (11/10/17 0942)   Resp   16 (11/10/17 0942)     SpO2   99 % (11/10/17 0942)     Post Anesthesia Care and Evaluation    Patient location during evaluation: bedside  Patient participation: complete - patient participated  Level of consciousness: awake  Pain management: adequate  Anesthetic complications: No anesthetic complications    Cardiovascular status: acceptable  Respiratory status: acceptable  Hydration status: acceptable

## 2017-11-10 NOTE — H&P
"[facility]  Pre Procedure History & Physical    Chief Complaint:   Dyspepsia and GERD    Subjective     HPI:   Patient 27-year-old female with history of reflux and atypical chest pain and palpitations as well as dyspepsia presenting for evaluation.  Patient denies vomiting but occasional nausea and chest symptoms increase when her dyspepsia worsens.    Past Medical History:   Past Medical History:   Diagnosis Date   • Chest pain    • GERD (gastroesophageal reflux disease)    • Hypoglycemia     in past.. over 10 years ago   • Lightheaded    • Malaise and fatigue    • Nodular hyperplasia of liver    • Palpitations    • PVC (premature ventricular contraction)     rare   • Right upper quadrant pain    • Sinus tachycardia    • Sternal pain     radiates to upper chest and back   • Symptomatic tachycardia     a couple of months ago...  saw [facility] cardiologist   • Syncope        Past Surgical History:  Past Surgical History:   Procedure Laterality Date   • COLONOSCOPY  11/11/2011    normal   • ENDOSCOPY  11/11/2011    normal esophagus, erythema, normal duodenum.       Family History:  Family History   Problem Relation Age of Onset   • Diabetes Mother    • Hypertension Mother    • Hypertension Father    • Diabetes Maternal Grandmother    • Pancreatic cancer Maternal Aunt    • Pancreatic cancer Maternal Uncle    • Malig Hyperthermia Neg Hx        Social History:   reports that she has never smoked. She does not have any smokeless tobacco history on file. She reports that she drinks alcohol. She reports that she does not use illicit drugs.    Medications:   No prescriptions prior to admission.       Allergies:  Penicillins    ROS:    Pertinent items are noted in HPI     Objective     Blood pressure 105/67, pulse 71, temperature 98.6 °F (37 °C), temperature source Oral, resp. rate 16, height 64\" (162.6 cm), weight 114 lb 8 oz (51.9 kg), last menstrual period 10/16/2017, SpO2 99 %.    Physical Exam "   Constitutional: Pt is oriented to person, place, and time and well-developed, well-nourished, and in no distress.   Mouth/Throat: Oropharynx is clear and moist.   Neck: Normal range of motion.   Cardiovascular: Normal rate, regular rhythm and normal heart sounds.    Pulmonary/Chest: Effort normal and breath sounds normal.   Abdominal: Soft. Nontender  Skin: Skin is warm and dry.   Psychiatric: Mood, memory, affect and judgment normal.     Assessment/Plan     Diagnosis:  Dyspepsia    Anticipated Surgical Procedure:  EGD    The risks, benefits, and alternatives of this procedure have been discussed with the patient or the responsible party- the patient understands and agrees to proceed.

## 2017-11-10 NOTE — BRIEF OP NOTE
ESOPHAGOGASTRODUODENOSCOPY  Progress Note    Queenie Mccoy  11/10/2017    Pre-op Diagnosis:   Gastroesophageal reflux disease with esophagitis [K21.0]       Post-Op Diagnosis Codes:     * Gastritis [K29.70]     * Duodenitis [K29.8]    Procedure/CPT® Codes:      Procedure(s):  ESOPHAGOGASTRODUODENOSCOPY with biopsies    Surgeon(s):  Wally Gama MD    Anesthesia: Monitor Anesthesia Care    Staff:   Endo Technician: Magaly Montano RN  Endo Nurse: Gayla Edwards RN    Estimated Blood Loss: minimal    Urine Voided: * No values recorded between 11/10/2017  9:08 AM and 11/10/2017  9:26 AM *    Specimens:                  ID Type Source Tests Collected by Time Destination   A :  Tissue Small Intestine, Duodenum TISSUE EXAM Wally Gama MD 11/10/2017 0921    B :  Tissue Gastric, Antrum TISSUE EXAM Wally Gama MD 11/10/2017 0923    C :  Tissue Esophagus, Mid TISSUE EXAM Wally Gama MD 11/10/2017 0923          Drains:           Findings: 27 female dyspepsia and GERD with globus  Mild duodenitis and gastritis  Normal esophagus    Complications: None      Wally Gama MD     Date: 11/10/2017  Time: 9:28 AM

## 2017-11-13 LAB
CYTO UR: NORMAL
LAB AP CASE REPORT: NORMAL
Lab: NORMAL
PATH REPORT.FINAL DX SPEC: NORMAL
PATH REPORT.GROSS SPEC: NORMAL

## 2017-12-15 ENCOUNTER — OFFICE VISIT (OUTPATIENT)
Dept: CARDIOLOGY | Facility: CLINIC | Age: 27
End: 2017-12-15

## 2017-12-15 VITALS
BODY MASS INDEX: 20.14 KG/M2 | HEART RATE: 84 BPM | DIASTOLIC BLOOD PRESSURE: 76 MMHG | HEIGHT: 64 IN | WEIGHT: 118 LBS | SYSTOLIC BLOOD PRESSURE: 108 MMHG | RESPIRATION RATE: 16 BRPM

## 2017-12-15 DIAGNOSIS — R06.09 DOE (DYSPNEA ON EXERTION): Primary | ICD-10-CM

## 2017-12-15 DIAGNOSIS — K21.00 GASTROESOPHAGEAL REFLUX DISEASE WITH ESOPHAGITIS: ICD-10-CM

## 2017-12-15 DIAGNOSIS — R00.2 PALPITATION: ICD-10-CM

## 2017-12-15 PROCEDURE — 93000 ELECTROCARDIOGRAM COMPLETE: CPT | Performed by: INTERNAL MEDICINE

## 2017-12-15 PROCEDURE — 99214 OFFICE O/P EST MOD 30 MIN: CPT | Performed by: INTERNAL MEDICINE

## 2017-12-15 NOTE — PROGRESS NOTES
PATIENTINFORMATION    Date of Office Visit: 12/15/2017  Encounter Provider: Alyson Caro MD  Place of Service: [facility] CARDIOLOGY  Patient Name: Queenie Mccoy  : 1990    Subjective:     Encounter Date:12/15/2017      Patient ID: Queenie Mccoy is a 27 y.o. female.      History of Present Illness    This is a nice lady who began having episodes of palpitations and free syncope in the fall of . Her primary care provider who referred her here today, ordered a ZIO Patch which showed sinus rhythm with an average heart rate of 83 beats per minute. There were rare premature ventricular contractions. She hit the button for symptoms twice and both times showed just sinus tachycardia. She also had an echocardiogram in 2016 which showed normal left ventricular systolic function with an ejection fraction of 60% and no valvular heart disease.      I saw her in 2017 for symptoms of palpitations.  I gave her a prescription for atenolol 25 mg twice a day as needed.  I did review all of her blood work and she was not anemic and her thyroid was normal as were her electrolytes.    I saw her back in 2017 and she was feeling well and had not needed to use the atenolol.    She comes in because she's been having palpitations again.  It's been going on for about 6 weeks.  She does note that she has increased stress in her life.  They come on randomly.  Her heart rate will be fast.  It lasts just a few minutes and that her heart rate will be back down to normal.  It's a gradual decline to normal.  One day she wasn't feeling well and walked out of the store and checked her watch and her heart rate was 150.  Usually her heart rates between 100 120 when she's having one of these fast episodes.  She feels weak and like she has less stamina that is normal.  She feels more short of breath when she's trying to exercise.    Review of Systems   Constitution: Negative for fever,  "malaise/fatigue, weight gain and weight loss.   HENT: Negative for ear pain, hearing loss, nosebleeds and sore throat.    Eyes: Negative for double vision, pain, vision loss in left eye and vision loss in right eye.   Cardiovascular:        See history of present illness.   Respiratory: Negative for cough, shortness of breath, sleep disturbances due to breathing, snoring and wheezing.    Endocrine: Negative for cold intolerance, heat intolerance and polyuria.   Skin: Negative for itching, poor wound healing and rash.   Musculoskeletal: Negative for joint pain, joint swelling and myalgias.   Gastrointestinal: Positive for abdominal pain and nausea. Negative for diarrhea, hematochezia and vomiting.   Genitourinary: Negative for hematuria and hesitancy.   Neurological: Positive for headaches and paresthesias. Negative for numbness and seizures.   Psychiatric/Behavioral: Negative for depression. The patient is nervous/anxious.            ECG 12 Lead  Date/Time: 12/15/2017 1:40 PM  Performed by: JOCELYN HANKINS  Authorized by: JOCELYN HANKINS   Comparison: compared with previous ECG from 4/6/2017  Similar to previous ECG  Rhythm: sinus rhythm  BPM: 84  ST Segments: ST segments normal  T Waves: T waves normal  Clinical impression: normal ECG               Objective:     /76 (BP Location: Right arm, Patient Position: Sitting, Cuff Size: Adult)  Pulse 84  Resp 16  Ht 162.6 cm (64\")  Wt 53.5 kg (118 lb)  BMI 20.25 kg/m2 Body mass index is 20.25 kg/(m^2).     Physical Exam   Constitutional: She appears well-developed.   HENT:   Head: Normocephalic and atraumatic.   Eyes: Conjunctivae and lids are normal. Pupils are equal, round, and reactive to light. Lids are everted and swept, no foreign bodies found.   Neck: Normal range of motion. No JVD present. Carotid bruit is not present. No tracheal deviation present. No thyroid mass present.   Cardiovascular: Normal rate, regular rhythm and normal heart sounds.  "   Pulses:       Dorsalis pedis pulses are 2+ on the right side, and 2+ on the left side.   Pulmonary/Chest: Effort normal and breath sounds normal.   Abdominal: Normal appearance and bowel sounds are normal.   Musculoskeletal: Normal range of motion.   Neurological: She is alert. She has normal strength.   Skin: Skin is warm, dry and intact.   Psychiatric: She has a normal mood and affect. Her behavior is normal.   Vitals reviewed.          Assessment/Plan:       1.  Palpitations.  She has not taken the atenolol because they lasted so short that she is afraid she will get her heart rate down too low with the atenolol.  2.  Shortness of breath and fatigue with exertion.  I'm going to check an echocardiogram again.  She had one in November 16 which was pretty normal.  I'm also been a do a treadmill EKG.  If those are normal, that would be reassuring that this is most likely from anxiety.    I will call her to go over the results of the treadmill and echo when I have them.    Orders Placed This Encounter   Procedures   • Treadmill Stress Test     Standing Status:   Future     Standing Expiration Date:   12/15/2018     Order Specific Question:   Reason for exam?     Answer:   Arrhythmia   • ECG 12 Lead     This order was created via procedure documentation   • Adult Transthoracic Echo Complete W/ Cont if Necessary Per Protocol     Standing Status:   Future     Standing Expiration Date:   12/15/2018     Order Specific Question:   Reason for exam?     Answer:   Dyspnea     Order Specific Question:   Reason for exam?     Answer:   Palpitations      Queenie Mccoy   Home Medication Instructions JAGDISH:    Printed on:12/15/17 6197   Medication Information                      esomeprazole (nexIUM) 40 MG capsule  Take 1 capsule by mouth Daily.                        Alyson Caro MD  12/15/17  1:44 PM

## 2017-12-20 ENCOUNTER — TELEPHONE (OUTPATIENT)
Dept: GASTROENTEROLOGY | Facility: CLINIC | Age: 27
End: 2017-12-20

## 2017-12-20 NOTE — TELEPHONE ENCOUNTER
----- Message from Wally Gama MD sent at 12/18/2017 10:45 AM EST -----  Biopsies normal f/u ov if still symptoms  Patient called, no answer, advised of Dr. Gama's note, advised to call back for questions/concern or if she needs a f/u office visit.

## 2017-12-21 ENCOUNTER — HOSPITAL ENCOUNTER (OUTPATIENT)
Dept: CARDIOLOGY | Facility: HOSPITAL | Age: 27
Discharge: HOME | End: 2017-12-21
Attending: INTERNAL MEDICINE

## 2017-12-21 ENCOUNTER — TELEPHONE (OUTPATIENT)
Dept: CARDIOLOGY | Facility: CLINIC | Age: 27
End: 2017-12-21

## 2017-12-21 ENCOUNTER — HOSPITAL ENCOUNTER (OUTPATIENT)
Dept: CARDIOLOGY | Facility: HOSPITAL | Age: 27
Discharge: HOME | End: 2017-12-21
Attending: INTERNAL MEDICINE | Admitting: INTERNAL MEDICINE

## 2017-12-21 VITALS
SYSTOLIC BLOOD PRESSURE: 117 MMHG | BODY MASS INDEX: 20.14 KG/M2 | DIASTOLIC BLOOD PRESSURE: 82 MMHG | WEIGHT: 118 LBS | HEIGHT: 64 IN | HEART RATE: 88 BPM

## 2017-12-21 DIAGNOSIS — R00.2 PALPITATION: ICD-10-CM

## 2017-12-21 DIAGNOSIS — R06.09 DOE (DYSPNEA ON EXERTION): ICD-10-CM

## 2017-12-21 DIAGNOSIS — R00.2 PALPITATION: Primary | ICD-10-CM

## 2017-12-21 DIAGNOSIS — R00.0 SINUS TACHYCARDIA: ICD-10-CM

## 2017-12-21 LAB
ASCENDING AORTA: 2.3 CM
BH CV ECHO MEAS - ACS: 1.9 CM
BH CV ECHO MEAS - AO MAX PG (FULL): 3 MMHG
BH CV ECHO MEAS - AO MAX PG: 5.2 MMHG
BH CV ECHO MEAS - AO MEAN PG (FULL): 1.1 MMHG
BH CV ECHO MEAS - AO MEAN PG: 2.6 MMHG
BH CV ECHO MEAS - AO ROOT AREA (BSA CORRECTED): 1.8
BH CV ECHO MEAS - AO ROOT AREA: 6.4 CM^2
BH CV ECHO MEAS - AO ROOT DIAM: 2.9 CM
BH CV ECHO MEAS - AO V2 MAX: 114 CM/SEC
BH CV ECHO MEAS - AO V2 MEAN: 74.4 CM/SEC
BH CV ECHO MEAS - AO V2 VTI: 26.2 CM
BH CV ECHO MEAS - AVA(I,A): 1.8 CM^2
BH CV ECHO MEAS - AVA(I,D): 1.8 CM^2
BH CV ECHO MEAS - AVA(V,A): 1.9 CM^2
BH CV ECHO MEAS - AVA(V,D): 1.9 CM^2
BH CV ECHO MEAS - BSA(HAYCOCK): 1.6 M^2
BH CV ECHO MEAS - BSA: 1.6 M^2
BH CV ECHO MEAS - BZI_BMI: 20.3 KILOGRAMS/M^2
BH CV ECHO MEAS - BZI_METRIC_HEIGHT: 162.6 CM
BH CV ECHO MEAS - BZI_METRIC_WEIGHT: 53.5 KG
BH CV ECHO MEAS - CONTRAST EF (2CH): 66.7 ML/M^2
BH CV ECHO MEAS - CONTRAST EF 4CH: 66.1 ML/M^2
BH CV ECHO MEAS - EDV(MOD-SP2): 69 ML
BH CV ECHO MEAS - EDV(MOD-SP4): 59 ML
BH CV ECHO MEAS - EDV(TEICH): 77.7 ML
BH CV ECHO MEAS - EF(CUBED): 72.9 %
BH CV ECHO MEAS - EF(MOD-SP2): 66.7 %
BH CV ECHO MEAS - EF(MOD-SP4): 66.1 %
BH CV ECHO MEAS - EF(TEICH): 65.1 %
BH CV ECHO MEAS - ESV(MOD-SP2): 23 ML
BH CV ECHO MEAS - ESV(MOD-SP4): 20 ML
BH CV ECHO MEAS - ESV(TEICH): 27.1 ML
BH CV ECHO MEAS - FS: 35.3 %
BH CV ECHO MEAS - IVS/LVPW: 1
BH CV ECHO MEAS - IVSD: 0.92 CM
BH CV ECHO MEAS - LAT PEAK E' VEL: 18 CM/SEC
BH CV ECHO MEAS - LV DIASTOLIC VOL/BSA (35-75): 37.7 ML/M^2
BH CV ECHO MEAS - LV MASS(C)D: 118.9 GRAMS
BH CV ECHO MEAS - LV MASS(C)DI: 76.1 GRAMS/M^2
BH CV ECHO MEAS - LV MAX PG: 2.2 MMHG
BH CV ECHO MEAS - LV MEAN PG: 1.4 MMHG
BH CV ECHO MEAS - LV SYSTOLIC VOL/BSA (12-30): 12.8 ML/M^2
BH CV ECHO MEAS - LV V1 MAX: 74.7 CM/SEC
BH CV ECHO MEAS - LV V1 MEAN: 58.6 CM/SEC
BH CV ECHO MEAS - LV V1 VTI: 16.7 CM
BH CV ECHO MEAS - LVIDD: 4.2 CM
BH CV ECHO MEAS - LVIDS: 2.7 CM
BH CV ECHO MEAS - LVLD AP2: 7 CM
BH CV ECHO MEAS - LVLD AP4: 6.3 CM
BH CV ECHO MEAS - LVLS AP2: 5.7 CM
BH CV ECHO MEAS - LVLS AP4: 5.3 CM
BH CV ECHO MEAS - LVOT AREA (M): 2.8 CM^2
BH CV ECHO MEAS - LVOT AREA: 2.8 CM^2
BH CV ECHO MEAS - LVOT DIAM: 1.9 CM
BH CV ECHO MEAS - LVPWD: 0.89 CM
BH CV ECHO MEAS - MED PEAK E' VEL: 12 CM/SEC
BH CV ECHO MEAS - MR MAX PG: 16.1 MMHG
BH CV ECHO MEAS - MR MAX VEL: 200.9 CM/SEC
BH CV ECHO MEAS - MV A DUR: 0.12 SEC
BH CV ECHO MEAS - MV A MAX VEL: 41.7 CM/SEC
BH CV ECHO MEAS - MV DEC SLOPE: 250.8 CM/SEC^2
BH CV ECHO MEAS - MV DEC TIME: 0.27 SEC
BH CV ECHO MEAS - MV E MAX VEL: 67.4 CM/SEC
BH CV ECHO MEAS - MV E/A: 1.6
BH CV ECHO MEAS - MV MAX PG: 4.7 MMHG
BH CV ECHO MEAS - MV MEAN PG: 1.5 MMHG
BH CV ECHO MEAS - MV P1/2T MAX VEL: 68.4 CM/SEC
BH CV ECHO MEAS - MV P1/2T: 79.9 MSEC
BH CV ECHO MEAS - MV V2 MAX: 108.6 CM/SEC
BH CV ECHO MEAS - MV V2 MEAN: 55.6 CM/SEC
BH CV ECHO MEAS - MV V2 VTI: 28 CM
BH CV ECHO MEAS - MVA P1/2T LCG: 3.2 CM^2
BH CV ECHO MEAS - MVA(P1/2T): 2.8 CM^2
BH CV ECHO MEAS - MVA(VTI): 1.7 CM^2
BH CV ECHO MEAS - PA ACC TIME: 0.16 SEC
BH CV ECHO MEAS - PA MAX PG (FULL): 0.45 MMHG
BH CV ECHO MEAS - PA MAX PG: 2.6 MMHG
BH CV ECHO MEAS - PA PR(ACCEL): 7.7 MMHG
BH CV ECHO MEAS - PA V2 MAX: 80.1 CM/SEC
BH CV ECHO MEAS - PULM A REVS DUR: 0.08 SEC
BH CV ECHO MEAS - PULM A REVS VEL: 19.9 CM/SEC
BH CV ECHO MEAS - PULM DIAS VEL: 43.7 CM/SEC
BH CV ECHO MEAS - PULM S/D: 1.1
BH CV ECHO MEAS - PULM SYS VEL: 47.5 CM/SEC
BH CV ECHO MEAS - PVA(V,A): 2.3 CM^2
BH CV ECHO MEAS - PVA(V,D): 2.3 CM^2
BH CV ECHO MEAS - QP/QS: 0.86
BH CV ECHO MEAS - RAP SYSTOLE: 3 MMHG
BH CV ECHO MEAS - RV MAX PG: 2.1 MMHG
BH CV ECHO MEAS - RV MEAN PG: 1.3 MMHG
BH CV ECHO MEAS - RV V1 MAX: 72.8 CM/SEC
BH CV ECHO MEAS - RV V1 MEAN: 56.5 CM/SEC
BH CV ECHO MEAS - RV V1 VTI: 15.9 CM
BH CV ECHO MEAS - RVOT AREA: 2.6 CM^2
BH CV ECHO MEAS - RVOT DIAM: 1.8 CM
BH CV ECHO MEAS - SI(AO): 107.5 ML/M^2
BH CV ECHO MEAS - SI(CUBED): 34 ML/M^2
BH CV ECHO MEAS - SI(LVOT): 30.4 ML/M^2
BH CV ECHO MEAS - SI(MOD-SP2): 29.4 ML/M^2
BH CV ECHO MEAS - SI(MOD-SP4): 25 ML/M^2
BH CV ECHO MEAS - SI(TEICH): 32.3 ML/M^2
BH CV ECHO MEAS - SUP REN AO DIAM: 1.3 CM
BH CV ECHO MEAS - SV(AO): 168.1 ML
BH CV ECHO MEAS - SV(CUBED): 53.2 ML
BH CV ECHO MEAS - SV(LVOT): 47.5 ML
BH CV ECHO MEAS - SV(MOD-SP2): 46 ML
BH CV ECHO MEAS - SV(MOD-SP4): 39 ML
BH CV ECHO MEAS - SV(RVOT): 40.7 ML
BH CV ECHO MEAS - SV(TEICH): 50.5 ML
BH CV ECHO MEAS - TAPSE (>1.6): 3 CM2
BH CV STRESS BP STAGE 1: NORMAL
BH CV STRESS BP STAGE 2: NORMAL
BH CV STRESS BP STAGE 3: NORMAL
BH CV STRESS BP STAGE 4: NORMAL
BH CV STRESS DURATION MIN STAGE 1: 3
BH CV STRESS DURATION MIN STAGE 2: 3
BH CV STRESS DURATION MIN STAGE 3: 3
BH CV STRESS DURATION MIN STAGE 4: 3
BH CV STRESS DURATION SEC STAGE 1: 0
BH CV STRESS DURATION SEC STAGE 2: 0
BH CV STRESS DURATION SEC STAGE 3: 0
BH CV STRESS DURATION SEC STAGE 4: 0
BH CV STRESS GRADE STAGE 1: 10
BH CV STRESS GRADE STAGE 2: 12
BH CV STRESS GRADE STAGE 3: 14
BH CV STRESS GRADE STAGE 4: 16
BH CV STRESS HR STAGE 1: 119
BH CV STRESS HR STAGE 2: 121
BH CV STRESS HR STAGE 3: 145
BH CV STRESS HR STAGE 4: 186
BH CV STRESS METS STAGE 1: 5
BH CV STRESS METS STAGE 2: 7.5
BH CV STRESS METS STAGE 3: 10
BH CV STRESS METS STAGE 4: 13.5
BH CV STRESS PROTOCOL 1: NORMAL
BH CV STRESS RECOVERY BP: NORMAL MMHG
BH CV STRESS RECOVERY HR: 100 BPM
BH CV STRESS SPEED STAGE 1: 1.7
BH CV STRESS SPEED STAGE 2: 2.5
BH CV STRESS SPEED STAGE 3: 3.4
BH CV STRESS SPEED STAGE 4: 4.2
BH CV STRESS STAGE 1: 1
BH CV STRESS STAGE 2: 2
BH CV STRESS STAGE 3: 3
BH CV STRESS STAGE 4: 4
BH CV XLRA - RV BASE: 2.6 CM
BH CV XLRA - TDI S': 19 CM/SEC
E/E' RATIO: 5
LEFT ATRIUM VOLUME INDEX: 15 ML/M2
LV EF 2D ECHO EST: 66 %
MAXIMAL PREDICTED HEART RATE: 192 BPM
PERCENT MAX PREDICTED HR: 96.88 %
SINUS: 2.4 CM
STJ: 2.4 CM
STRESS BASELINE BP: NORMAL MMHG
STRESS BASELINE HR: 84 BPM
STRESS PERCENT HR: 114 %
STRESS POST ESTIMATED WORKLOAD: 13 METS
STRESS POST EXERCISE DUR MIN: 12 MIN
STRESS POST EXERCISE DUR SEC: 0 SEC
STRESS POST PEAK BP: NORMAL MMHG
STRESS POST PEAK HR: 186 BPM
STRESS TARGET HR: 163 BPM

## 2017-12-21 PROCEDURE — 93017 CV STRESS TEST TRACING ONLY: CPT

## 2017-12-21 PROCEDURE — 93018 CV STRESS TEST I&R ONLY: CPT | Performed by: INTERNAL MEDICINE

## 2017-12-21 PROCEDURE — 93306 TTE W/DOPPLER COMPLETE: CPT

## 2017-12-21 PROCEDURE — 93016 CV STRESS TEST SUPVJ ONLY: CPT | Performed by: INTERNAL MEDICINE

## 2017-12-21 PROCEDURE — 93306 TTE W/DOPPLER COMPLETE: CPT | Performed by: INTERNAL MEDICINE

## 2017-12-21 NOTE — TELEPHONE ENCOUNTER
I called her and went over the results of her echo and her stress test.  Her stress test was normal but she did become very tachycardic with exercise.  I am going to put in for some basic blood work..  I will call her when I have the results of it.

## 2017-12-22 ENCOUNTER — LAB (OUTPATIENT)
Dept: LAB | Facility: HOSPITAL | Age: 27
End: 2017-12-22

## 2017-12-22 DIAGNOSIS — R00.0 SINUS TACHYCARDIA: ICD-10-CM

## 2017-12-22 DIAGNOSIS — R00.2 PALPITATION: ICD-10-CM

## 2017-12-22 LAB
25(OH)D3 SERPL-MCNC: 34.5 NG/ML (ref 30–100)
ALBUMIN SERPL-MCNC: 4.5 G/DL (ref 3.5–5.2)
ALBUMIN/GLOB SERPL: 1.7 G/DL
ALP SERPL-CCNC: 49 U/L (ref 39–117)
ALT SERPL W P-5'-P-CCNC: 15 U/L (ref 1–33)
ANION GAP SERPL CALCULATED.3IONS-SCNC: 11.3 MMOL/L
AST SERPL-CCNC: 17 U/L (ref 1–32)
BILIRUB SERPL-MCNC: 0.9 MG/DL (ref 0.1–1.2)
BUN BLD-MCNC: 16 MG/DL (ref 6–20)
BUN/CREAT SERPL: 21.9 (ref 7–25)
CALCIUM SPEC-SCNC: 9.6 MG/DL (ref 8.6–10.5)
CHLORIDE SERPL-SCNC: 103 MMOL/L (ref 98–107)
CHOLEST SERPL-MCNC: 138 MG/DL (ref 0–200)
CO2 SERPL-SCNC: 26.7 MMOL/L (ref 22–29)
CREAT BLD-MCNC: 0.73 MG/DL (ref 0.57–1)
DEPRECATED RDW RBC AUTO: 40.1 FL (ref 37–54)
ERYTHROCYTE [DISTWIDTH] IN BLOOD BY AUTOMATED COUNT: 12 % (ref 11.7–13)
FOLATE SERPL-MCNC: 9.45 NG/ML (ref 4.78–24.2)
GFR SERPL CREATININE-BSD FRML MDRD: 95 ML/MIN/1.73
GLOBULIN UR ELPH-MCNC: 2.6 GM/DL
GLUCOSE BLD-MCNC: 105 MG/DL (ref 65–99)
HCT VFR BLD AUTO: 40.8 % (ref 35.6–45.5)
HDLC SERPL-MCNC: 74 MG/DL (ref 40–60)
HGB BLD-MCNC: 14.1 G/DL (ref 11.9–15.5)
LDLC SERPL CALC-MCNC: 55 MG/DL (ref 0–100)
LDLC/HDLC SERPL: 0.74 {RATIO}
MAGNESIUM SERPL-MCNC: 2.1 MG/DL (ref 1.6–2.6)
MCH RBC QN AUTO: 31.4 PG (ref 26.9–32)
MCHC RBC AUTO-ENTMCNC: 34.6 G/DL (ref 32.4–36.3)
MCV RBC AUTO: 90.9 FL (ref 80.5–98.2)
PLATELET # BLD AUTO: 194 10*3/MM3 (ref 140–500)
PMV BLD AUTO: 10.2 FL (ref 6–12)
POTASSIUM BLD-SCNC: 4.2 MMOL/L (ref 3.5–5.2)
PROT SERPL-MCNC: 7.1 G/DL (ref 6–8.5)
RBC # BLD AUTO: 4.49 10*6/MM3 (ref 3.9–5.2)
SODIUM BLD-SCNC: 141 MMOL/L (ref 136–145)
T-UPTAKE NFR SERPL: 1.1 TBI (ref 0.8–1.3)
T4 SERPL-MCNC: 7.23 MCG/DL (ref 4.5–11.7)
TRIGL SERPL-MCNC: 47 MG/DL (ref 0–150)
TSH SERPL DL<=0.05 MIU/L-ACNC: 2.73 MIU/ML (ref 0.27–4.2)
VIT B12 BLD-MCNC: 253 PG/ML (ref 211–946)
VLDLC SERPL-MCNC: 9.4 MG/DL (ref 5–40)
WBC NRBC COR # BLD: 3.78 10*3/MM3 (ref 4.5–10.7)

## 2017-12-22 PROCEDURE — 84443 ASSAY THYROID STIM HORMONE: CPT | Performed by: INTERNAL MEDICINE

## 2017-12-22 PROCEDURE — 80053 COMPREHEN METABOLIC PANEL: CPT

## 2017-12-22 PROCEDURE — 80061 LIPID PANEL: CPT | Performed by: INTERNAL MEDICINE

## 2017-12-22 PROCEDURE — 82306 VITAMIN D 25 HYDROXY: CPT | Performed by: INTERNAL MEDICINE

## 2017-12-22 PROCEDURE — 82607 VITAMIN B-12: CPT | Performed by: INTERNAL MEDICINE

## 2017-12-22 PROCEDURE — 82652 VIT D 1 25-DIHYDROXY: CPT | Performed by: INTERNAL MEDICINE

## 2017-12-22 PROCEDURE — 84479 ASSAY OF THYROID (T3 OR T4): CPT | Performed by: INTERNAL MEDICINE

## 2017-12-22 PROCEDURE — 36415 COLL VENOUS BLD VENIPUNCTURE: CPT

## 2017-12-22 PROCEDURE — 85027 COMPLETE CBC AUTOMATED: CPT

## 2017-12-22 PROCEDURE — 82746 ASSAY OF FOLIC ACID SERUM: CPT | Performed by: INTERNAL MEDICINE

## 2017-12-22 PROCEDURE — 84436 ASSAY OF TOTAL THYROXINE: CPT | Performed by: INTERNAL MEDICINE

## 2017-12-22 PROCEDURE — 83735 ASSAY OF MAGNESIUM: CPT

## 2017-12-27 LAB — 1,25(OH)2D3 SERPL-MCNC: 71.1 PG/ML (ref 19.9–79.3)

## (undated) DEVICE — TUBING, SUCTION, 1/4" X 10', STRAIGHT: Brand: MEDLINE

## (undated) DEVICE — Device: Brand: DEFENDO AIR/WATER/SUCTION AND BIOPSY VALVE

## (undated) DEVICE — TBG 02 CRUSH RESIST LF CLR 7FT

## (undated) DEVICE — CANN NASL HI FLO COMFRT SFT A/ 7FT

## (undated) DEVICE — FRCP BX RADJAW4 NDL 2.8 240CM LG OG BX40

## (undated) DEVICE — BITEBLOCK OMNI BLOC